# Patient Record
Sex: MALE | Race: WHITE | NOT HISPANIC OR LATINO
[De-identification: names, ages, dates, MRNs, and addresses within clinical notes are randomized per-mention and may not be internally consistent; named-entity substitution may affect disease eponyms.]

---

## 2022-08-09 PROBLEM — Z00.00 ENCOUNTER FOR PREVENTIVE HEALTH EXAMINATION: Status: ACTIVE | Noted: 2022-08-09

## 2022-08-18 ENCOUNTER — APPOINTMENT (OUTPATIENT)
Dept: HEART AND VASCULAR | Facility: CLINIC | Age: 87
End: 2022-08-18

## 2022-08-18 ENCOUNTER — NON-APPOINTMENT (OUTPATIENT)
Age: 87
End: 2022-08-18

## 2022-08-18 VITALS
BODY MASS INDEX: 27.74 KG/M2 | RESPIRATION RATE: 12 BRPM | HEART RATE: 66 BPM | SYSTOLIC BLOOD PRESSURE: 150 MMHG | DIASTOLIC BLOOD PRESSURE: 80 MMHG | HEIGHT: 68 IN | WEIGHT: 183 LBS

## 2022-08-18 DIAGNOSIS — Z78.9 OTHER SPECIFIED HEALTH STATUS: ICD-10-CM

## 2022-08-18 DIAGNOSIS — I73.9 PERIPHERAL VASCULAR DISEASE, UNSPECIFIED: ICD-10-CM

## 2022-08-18 PROCEDURE — 99204 OFFICE O/P NEW MOD 45 MIN: CPT

## 2022-08-18 PROCEDURE — 93000 ELECTROCARDIOGRAM COMPLETE: CPT

## 2022-08-18 RX ORDER — ASPIRIN ENTERIC COATED TABLETS 81 MG 81 MG/1
81 TABLET, DELAYED RELEASE ORAL
Refills: 0 | Status: ACTIVE | COMMUNITY

## 2022-08-18 RX ORDER — ROSUVASTATIN CALCIUM 10 MG/1
10 TABLET, FILM COATED ORAL
Refills: 0 | Status: ACTIVE | COMMUNITY

## 2022-08-18 NOTE — PHYSICAL EXAM
[Well Developed] : well developed [Well Nourished] : well nourished [No Acute Distress] : no acute distress [Normal Conjunctiva] : normal conjunctiva [Normal Venous Pressure] : normal venous pressure [Carotid Bruit] : carotid bruit [Normal S1, S2] : normal S1, S2 [No Rub] : no rub [No Gallop] : no gallop [Clear Lung Fields] : clear lung fields [Good Air Entry] : good air entry [No Respiratory Distress] : no respiratory distress  [Soft] : abdomen soft [Non Tender] : non-tender [No Masses/organomegaly] : no masses/organomegaly [Normal Bowel Sounds] : normal bowel sounds [Normal Gait] : normal gait [No Edema] : no edema [No Cyanosis] : no cyanosis [No Clubbing] : no clubbing [No Varicosities] : no varicosities [No Rash] : no rash [No Skin Lesions] : no skin lesions [Moves all extremities] : moves all extremities [No Focal Deficits] : no focal deficits [Normal Speech] : normal speech [Alert and Oriented] : alert and oriented [Normal memory] : normal memory [de-identified] : lito

## 2022-08-18 NOTE — HISTORY OF PRESENT ILLNESS
[FreeTextEntry1] : 89-year-old male with a past medical history of CAD and peripheral vascular disease comes in for establishment of care. Overall feels well however does have nearly daily lightheadedness. Denies syncope or falling routinely but does have daily lightheadedness. Denies chest pain shortness of breath PND orthopnea.

## 2022-08-18 NOTE — DISCUSSION/SUMMARY
[FreeTextEntry1] : 1. CAD: Continue aspirin and nisoldipine. EKG revealed normal sinus rhythm mobitz 1 AV block. Will consider changing medication for blood pressure once results of an event monitor as known.\par 2. Lightheadedness: Will place a 3 day Holter monitor to assess for bradycardia arrhythmias.\par 2. Peripheral vascular disease: Continue aspirin Crestor. Followup blood work.\par \par Reviewed records from previous cardiologist patient underwent recent echocardiogram which revealed normal LV function and mild aortic stenosis.

## 2022-08-19 LAB
25(OH)D3 SERPL-MCNC: 35.5 NG/ML
ALBUMIN SERPL ELPH-MCNC: 4.8 G/DL
ALP BLD-CCNC: 79 U/L
ALT SERPL-CCNC: 31 U/L
ANION GAP SERPL CALC-SCNC: 13 MMOL/L
AST SERPL-CCNC: 23 U/L
BASOPHILS # BLD AUTO: 0.04 K/UL
BASOPHILS NFR BLD AUTO: 0.4 %
BILIRUB SERPL-MCNC: 0.9 MG/DL
BUN SERPL-MCNC: 21 MG/DL
CALCIUM SERPL-MCNC: 10.4 MG/DL
CHLORIDE SERPL-SCNC: 105 MMOL/L
CHOLEST SERPL-MCNC: 173 MG/DL
CO2 SERPL-SCNC: 28 MMOL/L
CREAT SERPL-MCNC: 1 MG/DL
EGFR: 71 ML/MIN/1.73M2
EOSINOPHIL # BLD AUTO: 0.05 K/UL
EOSINOPHIL NFR BLD AUTO: 0.5 %
ESTIMATED AVERAGE GLUCOSE: 126 MG/DL
FOLATE SERPL-MCNC: >20 NG/ML
GLUCOSE SERPL-MCNC: 92 MG/DL
HBA1C MFR BLD HPLC: 6 %
HCT VFR BLD CALC: 50.8 %
HDLC SERPL-MCNC: 68 MG/DL
HGB BLD-MCNC: 16 G/DL
IMM GRANULOCYTES NFR BLD AUTO: 0.5 %
LDLC SERPL CALC-MCNC: 70 MG/DL
LYMPHOCYTES # BLD AUTO: 2.36 K/UL
LYMPHOCYTES NFR BLD AUTO: 21.9 %
MAGNESIUM SERPL-MCNC: 2.5 MG/DL
MAN DIFF?: NORMAL
MCHC RBC-ENTMCNC: 30.9 PG
MCHC RBC-ENTMCNC: 31.5 GM/DL
MCV RBC AUTO: 98.1 FL
MONOCYTES # BLD AUTO: 0.81 K/UL
MONOCYTES NFR BLD AUTO: 7.5 %
NEUTROPHILS # BLD AUTO: 7.49 K/UL
NEUTROPHILS NFR BLD AUTO: 69.2 %
NONHDLC SERPL-MCNC: 105 MG/DL
PHOSPHATE SERPL-MCNC: 3.6 MG/DL
PLATELET # BLD AUTO: 297 K/UL
POTASSIUM SERPL-SCNC: 5.4 MMOL/L
PROT SERPL-MCNC: 7.2 G/DL
PSA SERPL-MCNC: <0.01 NG/ML
RBC # BLD: 5.18 M/UL
RBC # FLD: 13.6 %
SODIUM SERPL-SCNC: 146 MMOL/L
T3FREE SERPL-MCNC: 2.6 PG/ML
T4 FREE SERPL-MCNC: 1 NG/DL
T4 SERPL-MCNC: 6.3 UG/DL
TRIGL SERPL-MCNC: 175 MG/DL
TSH SERPL-ACNC: 2.37 UIU/ML
VIT B12 SERPL-MCNC: 402 PG/ML
WBC # FLD AUTO: 10.8 K/UL

## 2022-08-22 ENCOUNTER — APPOINTMENT (OUTPATIENT)
Dept: HEART AND VASCULAR | Facility: CLINIC | Age: 87
End: 2022-08-22

## 2022-08-22 VITALS — HEIGHT: 68 IN | WEIGHT: 187 LBS | BODY MASS INDEX: 28.34 KG/M2

## 2022-09-01 ENCOUNTER — NON-APPOINTMENT (OUTPATIENT)
Age: 87
End: 2022-09-01

## 2022-09-01 ENCOUNTER — APPOINTMENT (OUTPATIENT)
Dept: HEART AND VASCULAR | Facility: CLINIC | Age: 87
End: 2022-09-01

## 2022-09-01 VITALS
DIASTOLIC BLOOD PRESSURE: 81 MMHG | TEMPERATURE: 96.2 F | HEART RATE: 72 BPM | WEIGHT: 184 LBS | BODY MASS INDEX: 30.66 KG/M2 | OXYGEN SATURATION: 100 % | HEIGHT: 65 IN | SYSTOLIC BLOOD PRESSURE: 141 MMHG

## 2022-09-01 PROCEDURE — 93000 ELECTROCARDIOGRAM COMPLETE: CPT

## 2022-09-01 PROCEDURE — 99204 OFFICE O/P NEW MOD 45 MIN: CPT

## 2022-09-01 RX ORDER — RIVAROXABAN 15 MG/1
15 TABLET, FILM COATED ORAL
Refills: 0 | Status: DISCONTINUED | COMMUNITY
End: 2022-09-01

## 2022-09-06 NOTE — DISCUSSION/SUMMARY
[EKG obtained to assist in diagnosis and management of assessed problem(s)] : EKG obtained to assist in diagnosis and management of assessed problem(s) [FreeTextEntry1] : 89 year old male with HTN, HLD, PAD s/p carotid stent in 2022 and CAD s/p stents 10 years ago and symptomatic bradycardia, who presents for initial evaluation.  He is active with episodes of dizziness that could like vertigo but others that are clearly lightheadedness with recent near syncope.  We discussed the indications for a pacemaker and this will keep him safe from the slow heart rhythms seen on his monitor but if he does in fact have vertigo it will not help with those symptoms.  Given his near syncope and event monitor I have recommended a dual chamber pacemaker which he is amenable to.   We discussed the procedure in detail including risks, benefits, and alternatives.  We discussed procedural risks--including but not limited to bleeding/pocket hematoma, phlebitis/thrombophlebitis, lead dislodgment, PPM and/or lead infection, device malfunction, implantation site discomfort, pneumothorax, hemothorax, pericardial bleed/tamponade, anaphylaxis, air embolism, infection, skin erosion, lead fracture, lead dislodgement, pectoral muscle stimulation, intercostal or diaphragmatic pacing, vascular thrombosis, endocarditis or need for emergent surgery..\par \par After consideration of this information, the decision was made to proceed with device implantation. Mr. Sawyer appeared to understand the whole discussion and verbalized that all his questions were answered to his satisfaction.  He was given pre procedure instructions and knows to call with any questions or concerns.  \par \par

## 2022-09-06 NOTE — CARDIOLOGY SUMMARY
[de-identified] : 9/1/2022 sinus rhythm with a prolonged MI and APC [de-identified] : 5/2022 EF 65% mild RA dilation.  mild-mod TR right normal/left normal

## 2022-09-06 NOTE — REVIEW OF SYSTEMS
[Dizziness] : dizziness [Negative] : Integumentary [Fever] : no fever [Chills] : no chills [Feeling Fatigued] : not feeling fatigued [SOB] : no shortness of breath [Dyspnea on exertion] : not dyspnea during exertion [Palpitations] : no palpitations [Orthopnea] : no orthopnea [Easy Bleeding] : no tendency for easy bleeding

## 2022-09-06 NOTE — HISTORY OF PRESENT ILLNESS
[FreeTextEntry1] : 89 year old male with HTN, HLD, PAD s/p carotid stent in 2022 and CAD s/p stents 10 years ago and symptomatic bradycardia, who presents for initial evaluation.\par \par He states he gets lightheaded "every day" that also sometimes feels like the room is spinning.  He also regularly feels imbalanced.  This has been going on for at least 5 years.  This past weekend for the first time he thought he was almost going to pass out.  He states he was in his usual state of health and got off the bus and then "everything almost blacked out".  He also feels a chest sensation that is not exertional and states he had a cardiac workup and was told his "stents look good".  No SOB, orthopnea, PND or palpations. \par \par He wore an event monitor with heart rates 39=.  One short burst of an SVT.  Episodes of Wenckebach and some high degree AV block.  Most episodes while sleeping.

## 2022-09-06 NOTE — PHYSICAL EXAM
[Well Developed] : well developed [Well Nourished] : well nourished [No Acute Distress] : no acute distress [5th Left ICS - MCL] : palpated at the 5th LICS in the midclavicular line [Normal Rate] : normal [Rhythm Regular] : regular [Normal S1] : normal S1 [Normal S2] : normal S2 [Normal Gait] : normal gait [No Edema] : no edema [No Rash] : no rash [Moves all extremities] : moves all extremities [Alert and Oriented] : alert and oriented

## 2022-09-06 NOTE — ADDENDUM
[FreeTextEntry1] : I, Ac Wisdom, hereby attest that the medical record entry for this patient accurately reflects signatures/notations that I made on the Date of Service in my capacity as an Attending Physician when I treated/diagnosed the above patient. I do hereby attest that this information is true, accurate and complete to the best of my knowledge and I understand that any falsification, omission, or concealment of material fact may subject me to administrative, civil, or, criminal liability. I agree with the note as written by my PA in its entirety.\par I was present for the entire visit and supervised the entire visit and agree with the plan as outlined.\par \par

## 2022-09-07 LAB — SARS-COV-2 N GENE NPH QL NAA+PROBE: NOT DETECTED

## 2022-09-09 ENCOUNTER — INPATIENT (INPATIENT)
Facility: HOSPITAL | Age: 87
LOS: 0 days | Discharge: HOME | DRG: 244 | End: 2022-09-10
Attending: INTERNAL MEDICINE | Admitting: INTERNAL MEDICINE
Payer: COMMERCIAL

## 2022-09-09 VITALS — HEIGHT: 68 IN | WEIGHT: 182.1 LBS

## 2022-09-09 DIAGNOSIS — Z90.49 ACQUIRED ABSENCE OF OTHER SPECIFIED PARTS OF DIGESTIVE TRACT: Chronic | ICD-10-CM

## 2022-09-09 DIAGNOSIS — Z98.890 OTHER SPECIFIED POSTPROCEDURAL STATES: Chronic | ICD-10-CM

## 2022-09-09 DIAGNOSIS — I49.5 SICK SINUS SYNDROME: ICD-10-CM

## 2022-09-09 DIAGNOSIS — Z98.62 PERIPHERAL VASCULAR ANGIOPLASTY STATUS: Chronic | ICD-10-CM

## 2022-09-09 LAB
ISTAT INR: 1.1 — SIGNIFICANT CHANGE UP (ref 0.88–1.16)
ISTAT PT: 13.2 SEC — HIGH (ref 10–12.9)
ISTAT VENOUS BE: 4 MMOL/L — HIGH (ref -2–3)
ISTAT VENOUS GLUCOSE: 101 MG/DL — HIGH (ref 70–99)
ISTAT VENOUS HCO3: 30 MMOL/L — HIGH (ref 23–28)
ISTAT VENOUS HEMATOCRIT: 50 % — SIGNIFICANT CHANGE UP (ref 39–50)
ISTAT VENOUS HEMOGLOBIN: 17 GM/DL — SIGNIFICANT CHANGE UP (ref 13–17)
ISTAT VENOUS IONIZED CALCIUM: 1.24 MMOL/L — SIGNIFICANT CHANGE UP (ref 1.12–1.3)
ISTAT VENOUS PCO2: 51 MMHG — SIGNIFICANT CHANGE UP (ref 41–51)
ISTAT VENOUS PH: 7.38 — SIGNIFICANT CHANGE UP (ref 7.31–7.41)
ISTAT VENOUS PO2: <66 MMHG — LOW (ref 35–40)
ISTAT VENOUS POTASSIUM: 3.7 MMOL/L — SIGNIFICANT CHANGE UP (ref 3.5–5.3)
ISTAT VENOUS SO2: 64 % — SIGNIFICANT CHANGE UP
ISTAT VENOUS SODIUM: 143 MMOL/L — SIGNIFICANT CHANGE UP (ref 135–145)
ISTAT VENOUS TCO2: 32 MMOL/L — HIGH (ref 22–31)
POCT ISTAT CREATININE: 1 MG/DL — SIGNIFICANT CHANGE UP (ref 0.5–1.3)

## 2022-09-09 PROCEDURE — 33208 INSRT HEART PM ATRIAL & VENT: CPT | Mod: KX

## 2022-09-09 PROCEDURE — ZZZZZ: CPT

## 2022-09-09 RX ORDER — ASPIRIN/CALCIUM CARB/MAGNESIUM 324 MG
81 TABLET ORAL DAILY
Refills: 0 | Status: DISCONTINUED | OUTPATIENT
Start: 2022-09-09 | End: 2022-09-10

## 2022-09-09 RX ORDER — ASPIRIN/CALCIUM CARB/MAGNESIUM 324 MG
1 TABLET ORAL
Qty: 0 | Refills: 0 | DISCHARGE

## 2022-09-09 RX ORDER — CEFAZOLIN SODIUM 1 G
1000 VIAL (EA) INJECTION EVERY 8 HOURS
Refills: 0 | Status: COMPLETED | OUTPATIENT
Start: 2022-09-09 | End: 2022-09-10

## 2022-09-09 RX ORDER — ROSUVASTATIN CALCIUM 5 MG/1
1 TABLET ORAL
Qty: 0 | Refills: 0 | DISCHARGE

## 2022-09-09 RX ORDER — AMLODIPINE BESYLATE 2.5 MG/1
5 TABLET ORAL DAILY
Refills: 0 | Status: DISCONTINUED | OUTPATIENT
Start: 2022-09-09 | End: 2022-09-10

## 2022-09-09 RX ORDER — INFLUENZA VIRUS VACCINE 15; 15; 15; 15 UG/.5ML; UG/.5ML; UG/.5ML; UG/.5ML
0.7 SUSPENSION INTRAMUSCULAR ONCE
Refills: 0 | Status: COMPLETED | OUTPATIENT
Start: 2022-09-09 | End: 2022-09-09

## 2022-09-09 RX ORDER — NISOLDIPINE 25.5 MG/1
1 TABLET, FILM COATED, EXTENDED RELEASE ORAL
Qty: 0 | Refills: 0 | DISCHARGE

## 2022-09-09 RX ORDER — ATORVASTATIN CALCIUM 80 MG/1
40 TABLET, FILM COATED ORAL DAILY
Refills: 0 | Status: DISCONTINUED | OUTPATIENT
Start: 2022-09-09 | End: 2022-09-10

## 2022-09-09 RX ORDER — ACETAMINOPHEN 500 MG
650 TABLET ORAL EVERY 6 HOURS
Refills: 0 | Status: DISCONTINUED | OUTPATIENT
Start: 2022-09-09 | End: 2022-09-10

## 2022-09-09 RX ADMIN — Medication 100 MILLIGRAM(S): at 21:47

## 2022-09-09 RX ADMIN — AMLODIPINE BESYLATE 5 MILLIGRAM(S): 2.5 TABLET ORAL at 12:16

## 2022-09-09 RX ADMIN — ATORVASTATIN CALCIUM 40 MILLIGRAM(S): 80 TABLET, FILM COATED ORAL at 21:47

## 2022-09-09 RX ADMIN — Medication 650 MILLIGRAM(S): at 23:13

## 2022-09-09 RX ADMIN — Medication 650 MILLIGRAM(S): at 13:22

## 2022-09-09 RX ADMIN — Medication 650 MILLIGRAM(S): at 12:59

## 2022-09-09 RX ADMIN — Medication 81 MILLIGRAM(S): at 12:15

## 2022-09-09 NOTE — H&P ADULT - NSICDXPASTMEDICALHX_GEN_ALL_CORE_FT
PAST MEDICAL HISTORY:  CAD (coronary artery disease)     HLD (hyperlipidemia)     HTN (hypertension)     PAD (peripheral artery disease)

## 2022-09-09 NOTE — PATIENT PROFILE ADULT - 
ADDITIONAL INFORMATION
Unable to remember but only recalls getting vaccine at the hospital when he was admitted a few years ago. Most likely [place]

## 2022-09-09 NOTE — H&P ADULT - HISTORY OF PRESENT ILLNESS
89 year old male with HTN, HLD, PAD s/p carotid stent in 2022 and CAD s/p stents 10 years ago and symptomatic bradycardia, SSS,  who presents for pacemaker implant ,   He states he gets lightheaded "every day" that also sometimes feels like the room is spinning.  He also regularly feels imbalanced.  This has been going on for at least 5 years.  This past weekend for the first time he thought he was almost going to pass out.  He states he was in his usual state of health and got off the bus and then "everything almost blacked out".  He also feels a chest sensation that is not exertional and states he had a cardiac workup and was told his "stents look good".  No SOB, orthopnea, PND or palpations.   He wore an event monitor with heart rates 39=.  One short burst of an SVT.  Episodes of Wenckebach and some high degree AV block.  Most episodes while sleeping.

## 2022-09-09 NOTE — H&P ADULT - NSICDXPASTSURGICALHX_GEN_ALL_CORE_FT
PAST SURGICAL HISTORY:  H/O hernia repair     History of appendectomy     History of carotid angioplasty

## 2022-09-09 NOTE — PATIENT PROFILE ADULT - FALL HARM RISK - HARM RISK INTERVENTIONS

## 2022-09-09 NOTE — H&P ADULT - ASSESSMENT
89 year old male with HTN, HLD, PAD s/p carotid stent in 2022 and CAD s/p stents 10 years ago and symptomatic bradycardia, SSS,  who presents for pacemaker implant ,

## 2022-09-09 NOTE — PROGRESS NOTE ADULT - SUBJECTIVE AND OBJECTIVE BOX
Brief note, full note in results section of [facility].  Access via two left axillary punctures.  [employer] DDD (MRI compatible) pacemaker implanted.  No complications.  Chest x-ray and ECG.  Expect discharge home in am.

## 2022-09-10 ENCOUNTER — TRANSCRIPTION ENCOUNTER (OUTPATIENT)
Age: 87
End: 2022-09-10

## 2022-09-10 VITALS
RESPIRATION RATE: 17 BRPM | OXYGEN SATURATION: 99 % | HEART RATE: 71 BPM | SYSTOLIC BLOOD PRESSURE: 124 MMHG | DIASTOLIC BLOOD PRESSURE: 68 MMHG

## 2022-09-10 PROCEDURE — 71046 X-RAY EXAM CHEST 2 VIEWS: CPT | Mod: 26

## 2022-09-10 RX ADMIN — Medication 650 MILLIGRAM(S): at 00:00

## 2022-09-10 RX ADMIN — Medication 100 MILLIGRAM(S): at 05:45

## 2022-09-10 RX ADMIN — AMLODIPINE BESYLATE 5 MILLIGRAM(S): 2.5 TABLET ORAL at 05:45

## 2022-09-10 RX ADMIN — Medication 81 MILLIGRAM(S): at 11:40

## 2022-09-10 NOTE — DISCHARGE NOTE PROVIDER - NSDCFUSCHEDAPPT_GEN_ALL_CORE_FT
Clint Merritt  [facility]  [facility] [address]  Scheduled Appointment: 11/15/2022

## 2022-09-10 NOTE — DISCHARGE NOTE NURSING/CASE MANAGEMENT/SOCIAL WORK - NSDCPEFALRISK_GEN_ALL_CORE
For information on Fall & Injury Prevention, visit: [email or web address] OR  [email or web address] OR  https://www.cdc.gov/steadi/patient.html

## 2022-09-10 NOTE — DISCHARGE NOTE PROVIDER - HOSPITAL COURSE
89 year old male with HTN, HLD, PAD s/p carotid stent in 2022 and CAD s/p stents 10 years ago and symptomatic bradycardia, SSS,  who presents for pacemaker implant.    Problem List/Main Diagnoses :  # Sick sinus syndrome. Patient had [employer] DDD (MRI compatible) pacemaker implanted. No complications.    New medications: None

## 2022-09-10 NOTE — DISCHARGE NOTE PROVIDER - NSDCMRMEDTOKEN_GEN_ALL_CORE_FT
aspirin 81 mg oral delayed release tablet: 1 tab(s) orally once a day  nisoldipine 17 mg oral tablet, extended release: 1 tab(s) orally once a day  rosuvastatin 10 mg oral tablet: 1 tab(s) orally once a day

## 2022-09-10 NOTE — DISCHARGE NOTE PROVIDER - NSDCACTIVITY_GEN_ALL_CORE
No restrictions No restrictions/Showering allowed/Walking - Indoors allowed/No heavy lifting/straining/Walking - Outdoors allowed

## 2022-09-10 NOTE — DISCHARGE NOTE PROVIDER - NSDCCPCAREPLAN_GEN_ALL_CORE_FT
PRINCIPAL DISCHARGE DIAGNOSIS  Diagnosis: Sick sinus syndrome  Assessment and Plan of Treatment: You have sick sinus syndrome. Sick sinus syndrome is a heart rhythm disorder. Your heart may beat too slowly or too quickly. It may go back and forth from too slow to too fast. You may have pauses in between beats.   You had a [employer] DDD (MRI compatible) pacemaker implanted. You had no complications. Please follow up with Dr. Wisdom next week in clinic.

## 2022-09-10 NOTE — DISCHARGE NOTE PROVIDER - CARE PROVIDER_API CALL
Ac Wisdom (MD)  Cardiac Electrophysiology; Cardiovascular Disease; Internal Medicine  [address] [place] [address]  Phone: [phone]  Fax: [phone]  Follow Up Time:

## 2022-09-10 NOTE — DISCHARGE NOTE NURSING/CASE MANAGEMENT/SOCIAL WORK - PATIENT PORTAL LINK FT
You can access the FollowMyHealth Patient Portal offered by [facility] by registering at the following website: [email or web address]. By joining [facility] [employer]’s FollowMyHealth portal, you will also be able to view your health information using other applications (apps) compatible with our system.

## 2022-09-12 PROBLEM — I25.10 ATHEROSCLEROTIC HEART DISEASE OF NATIVE CORONARY ARTERY WITHOUT ANGINA PECTORIS: Chronic | Status: ACTIVE | Noted: 2022-09-09

## 2022-09-12 PROBLEM — I73.9 PERIPHERAL VASCULAR DISEASE, UNSPECIFIED: Chronic | Status: ACTIVE | Noted: 2022-09-09

## 2022-09-12 PROBLEM — E78.5 HYPERLIPIDEMIA, UNSPECIFIED: Chronic | Status: ACTIVE | Noted: 2022-09-09

## 2022-09-12 PROBLEM — I10 ESSENTIAL (PRIMARY) HYPERTENSION: Chronic | Status: ACTIVE | Noted: 2022-09-09

## 2022-09-14 DIAGNOSIS — I73.9 PERIPHERAL VASCULAR DISEASE, UNSPECIFIED: ICD-10-CM

## 2022-09-14 DIAGNOSIS — I49.5 SICK SINUS SYNDROME: ICD-10-CM

## 2022-09-14 DIAGNOSIS — I49.1 ATRIAL PREMATURE DEPOLARIZATION: ICD-10-CM

## 2022-09-14 DIAGNOSIS — I51.9 HEART DISEASE, UNSPECIFIED: ICD-10-CM

## 2022-09-14 DIAGNOSIS — I25.10 ATHEROSCLEROTIC HEART DISEASE OF NATIVE CORONARY ARTERY WITHOUT ANGINA PECTORIS: ICD-10-CM

## 2022-09-14 DIAGNOSIS — I10 ESSENTIAL (PRIMARY) HYPERTENSION: ICD-10-CM

## 2022-09-14 DIAGNOSIS — E78.5 HYPERLIPIDEMIA, UNSPECIFIED: ICD-10-CM

## 2022-09-14 DIAGNOSIS — Z95.5 PRESENCE OF CORONARY ANGIOPLASTY IMPLANT AND GRAFT: ICD-10-CM

## 2022-09-14 DIAGNOSIS — Z79.82 LONG TERM (CURRENT) USE OF ASPIRIN: ICD-10-CM

## 2022-09-15 PROCEDURE — C1785: CPT

## 2022-09-15 PROCEDURE — 85014 HEMATOCRIT: CPT

## 2022-09-15 PROCEDURE — 82803 BLOOD GASES ANY COMBINATION: CPT

## 2022-09-15 PROCEDURE — 82947 ASSAY GLUCOSE BLOOD QUANT: CPT

## 2022-09-15 PROCEDURE — C1898: CPT

## 2022-09-15 PROCEDURE — 82565 ASSAY OF CREATININE: CPT

## 2022-09-15 PROCEDURE — 84132 ASSAY OF SERUM POTASSIUM: CPT

## 2022-09-15 PROCEDURE — 33208 INSRT HEART PM ATRIAL & VENT: CPT

## 2022-09-15 PROCEDURE — 84295 ASSAY OF SERUM SODIUM: CPT

## 2022-09-15 PROCEDURE — 71046 X-RAY EXAM CHEST 2 VIEWS: CPT

## 2022-09-15 PROCEDURE — 82330 ASSAY OF CALCIUM: CPT

## 2022-09-15 PROCEDURE — 85610 PROTHROMBIN TIME: CPT

## 2022-11-10 ENCOUNTER — APPOINTMENT (OUTPATIENT)
Dept: HEART AND VASCULAR | Facility: CLINIC | Age: 87
End: 2022-11-10

## 2022-11-10 VITALS
TEMPERATURE: 96.2 F | HEIGHT: 65 IN | HEART RATE: 71 BPM | WEIGHT: 184 LBS | BODY MASS INDEX: 30.66 KG/M2 | SYSTOLIC BLOOD PRESSURE: 136 MMHG | DIASTOLIC BLOOD PRESSURE: 60 MMHG

## 2022-11-10 PROCEDURE — 93280 PM DEVICE PROGR EVAL DUAL: CPT

## 2022-11-15 ENCOUNTER — RX RENEWAL (OUTPATIENT)
Age: 87
End: 2022-11-15

## 2022-11-15 ENCOUNTER — APPOINTMENT (OUTPATIENT)
Dept: HEART AND VASCULAR | Facility: CLINIC | Age: 87
End: 2022-11-15

## 2022-11-15 ENCOUNTER — NON-APPOINTMENT (OUTPATIENT)
Age: 87
End: 2022-11-15

## 2022-11-15 VITALS
HEIGHT: 65 IN | HEART RATE: 80 BPM | RESPIRATION RATE: 12 BRPM | DIASTOLIC BLOOD PRESSURE: 90 MMHG | WEIGHT: 188 LBS | BODY MASS INDEX: 31.32 KG/M2 | SYSTOLIC BLOOD PRESSURE: 150 MMHG

## 2022-11-15 PROCEDURE — 93000 ELECTROCARDIOGRAM COMPLETE: CPT

## 2022-11-15 PROCEDURE — 99214 OFFICE O/P EST MOD 30 MIN: CPT

## 2022-11-15 RX ORDER — OXYCODONE HYDROCHLORIDE 15 MG/1
15 TABLET ORAL
Qty: 90 | Refills: 0 | Status: DISCONTINUED | COMMUNITY
Start: 2022-10-27

## 2022-11-15 RX ORDER — NISOLDIPINE 17 MG/1
17 TABLET, FILM COATED, EXTENDED RELEASE ORAL
Refills: 0 | Status: DISCONTINUED | COMMUNITY
End: 2022-11-15

## 2022-11-15 NOTE — DISCUSSION/SUMMARY
[FreeTextEntry1] : 1.  Lightheadedness: Looked up nisoldipine, and 3 to 10% of patients can have lightheadedness and dizziness.  Advised to discontinue.\par 2.  Hypertension: Will start Toprol 50 mg daily, advised to continue to monitor blood pressure and heart rate. [EKG obtained to assist in diagnosis and management of assessed problem(s)] : EKG obtained to assist in diagnosis and management of assessed problem(s)

## 2022-11-15 NOTE — HISTORY OF PRESENT ILLNESS
[FreeTextEntry1] : 89-year-old male with a past medical history of hypertension status post permanent pacemaker implant.  Patient was complaining of lightheadedness and near syncope.  Was seen by EP recommended to have a permanent pacemaker implant.  Patient is now status post pacemaker implant however still feels lightheaded.  Has never syncopized.

## 2022-11-20 NOTE — DISCUSSION/SUMMARY
[FreeTextEntry1] : 89 year old male with HTN, HLD, PAD s/p carotid stent in 2022 and CAD s/p stents 10 years ago and symptomatic bradycardia, with complaints of dizziness and near syncope. He underwent a  dual chamber pacemaker implant in September and presents today for a follow-up. He still complains of dizziness, and is yet to see an ENT specialist. He does have a high apc burden on his monitor, i recommend a trial of beta blocker therapy, to suprress james atrial ectopy. All of his questions were answered and he  knows to contact me should any further queries arise.

## 2022-11-20 NOTE — CARDIOLOGY SUMMARY
[de-identified] : 11/10/2022- sinus , ventricular paced rhythm at 76 beats per min\par 9/1/2022 sinus rhythm with a prolonged TX and APC [de-identified] : 5/2022 EF 65% mild RA dilation.  mild-mod TR

## 2022-11-20 NOTE — PROCEDURE
[No] : not [NSR] : normal sinus rhythm [Pacemaker] : pacemaker [DDD] : DDD [Lead Imp:  ___ohms] : lead impedance was [unfilled] ohms [Sensing Amplitude ___mv] : sensing amplitude was [unfilled] mv [___V @] : [unfilled] V [___ ms] : [unfilled] ms [Threshold Testing Performed] : Threshold testing was not performed [de-identified] : [facility] [de-identified] : [employer] mri el [de-identified] : [id number] [de-identified] : 09/09/22 [de-identified] : Naz SHAHI with vvi back up [de-identified] : 9.5  [de-identified] : A paced 57%, \par V Paced 74%

## 2022-11-20 NOTE — ADDENDUM
[FreeTextEntry1] : I, Ac Wisdom, hereby attest that the medical record entry for this patient accurately reflects signatures/notations that I made on the Date of Service in my capacity as an Attending Physician when I treated/diagnosed the above patient. I do hereby attest that this information is true, accurate and complete to the best of my knowledge and I understand that any falsification, omission, or concealment of material fact may subject me to administrative, civil, or, criminal liability. I agree with the note as written by my Fellow in its entirety.\par I was present for the entire visit and supervised the entire visit and agree with the plan as outlined.\par \par

## 2022-11-20 NOTE — HISTORY OF PRESENT ILLNESS
[Dizziness] : dizziness [FreeTextEntry1] : 89 year old male with HTN, HLD, PAD s/p carotid stent in 2022 and CAD s/p stents 10 years ago and symptomatic bradycardia, who presents for initial evaluation.\par \par He states he gets lightheaded "every day" that also sometimes feels like the room is spinning.  He also regularly feels imbalanced.  This has been going on for at least 5 years.  This past weekend for the first time he thought he was almost going to pass out.  He states he was in his usual state of health and got off the bus and then "everything almost blacked out".  He also feels a chest sensation that is not exertional and states he had a cardiac workup and was told his "stents look good".  No SOB, orthopnea, PND or palpations. \par \par He wore an event monitor with heart rates 39=.  One short burst of an SVT.  Episodes of Wenckebach and some high degree AV block.  Most episodes while sleeping.

## 2022-11-29 ENCOUNTER — APPOINTMENT (OUTPATIENT)
Dept: HEART AND VASCULAR | Facility: CLINIC | Age: 87
End: 2022-11-29

## 2022-11-29 VITALS
HEART RATE: 66 BPM | WEIGHT: 188 LBS | BODY MASS INDEX: 31.32 KG/M2 | DIASTOLIC BLOOD PRESSURE: 88 MMHG | SYSTOLIC BLOOD PRESSURE: 144 MMHG | RESPIRATION RATE: 12 BRPM | HEIGHT: 65 IN

## 2022-11-29 PROCEDURE — 99214 OFFICE O/P EST MOD 30 MIN: CPT

## 2022-11-29 NOTE — DISCUSSION/SUMMARY
[FreeTextEntry1] : 1.  Hypertension: Blood pressure adequately controlled on current medication advised to continue and to remain off nisoldipine.  Maintain a low-salt diet.\par 2.  Lightheadedness: Suggest neurology evaluation if symptoms persist or worsen.

## 2022-11-29 NOTE — HISTORY OF PRESENT ILLNESS
[FreeTextEntry1] : 89-year-old male with a past medical history of hypertension status post permanent pacemaker.  Since changing antihypertensive medication patient does report continued lightheadedness, does not see marked improvement in lightheadedness however denies any syncope.

## 2022-12-12 ENCOUNTER — APPOINTMENT (OUTPATIENT)
Dept: HEART AND VASCULAR | Facility: CLINIC | Age: 87
End: 2022-12-12

## 2022-12-12 ENCOUNTER — NON-APPOINTMENT (OUTPATIENT)
Age: 87
End: 2022-12-12

## 2022-12-12 PROCEDURE — 93296 REM INTERROG EVL PM/IDS: CPT

## 2022-12-12 PROCEDURE — 93294 REM INTERROG EVL PM/LDLS PM: CPT

## 2022-12-13 RX ORDER — PHENOBARBITAL 15 MG/1
15 TABLET ORAL DAILY
Qty: 30 | Refills: 0 | Status: ACTIVE | OUTPATIENT
Start: 2022-12-13

## 2023-03-13 ENCOUNTER — NON-APPOINTMENT (OUTPATIENT)
Age: 88
End: 2023-03-13

## 2023-03-13 ENCOUNTER — APPOINTMENT (OUTPATIENT)
Dept: HEART AND VASCULAR | Facility: CLINIC | Age: 88
End: 2023-03-13
Payer: MEDICARE

## 2023-03-13 PROCEDURE — 93294 REM INTERROG EVL PM/LDLS PM: CPT

## 2023-03-13 PROCEDURE — 93296 REM INTERROG EVL PM/IDS: CPT

## 2023-03-28 ENCOUNTER — NON-APPOINTMENT (OUTPATIENT)
Age: 88
End: 2023-03-28

## 2023-03-28 ENCOUNTER — APPOINTMENT (OUTPATIENT)
Dept: HEART AND VASCULAR | Facility: CLINIC | Age: 88
End: 2023-03-28
Payer: MEDICARE

## 2023-03-28 VITALS
HEART RATE: 70 BPM | WEIGHT: 190 LBS | BODY MASS INDEX: 31.62 KG/M2 | RESPIRATION RATE: 12 BRPM | DIASTOLIC BLOOD PRESSURE: 80 MMHG | SYSTOLIC BLOOD PRESSURE: 130 MMHG

## 2023-03-28 DIAGNOSIS — R42 DIZZINESS AND GIDDINESS: ICD-10-CM

## 2023-03-28 PROCEDURE — 93000 ELECTROCARDIOGRAM COMPLETE: CPT

## 2023-03-28 PROCEDURE — 99214 OFFICE O/P EST MOD 30 MIN: CPT

## 2023-03-28 NOTE — DISCUSSION/SUMMARY
[FreeTextEntry1] : 1.  CAD: Continue aspirin and metoprolol\par 2.  Hyperlipidemia: Continue Crestor\par 3.  Lightheadedness: Chronic and stable.  Patient status post permanent pacemaker advised to follow-up with EP for interrogation. [EKG obtained to assist in diagnosis and management of assessed problem(s)] : EKG obtained to assist in diagnosis and management of assessed problem(s)

## 2023-03-28 NOTE — HISTORY OF PRESENT ILLNESS
[FreeTextEntry1] : 89-year-old male with past medical history of hyperlipidemia hypertension comes in for follow-up.  Overall, feels at baseline continues to suffer from lightheadedness however no syncope.  Denies chest pain shortness of breath PND orthopnea.

## 2023-03-29 ENCOUNTER — NON-APPOINTMENT (OUTPATIENT)
Age: 88
End: 2023-03-29

## 2023-03-29 LAB
ALBUMIN SERPL ELPH-MCNC: 4.8 G/DL
ALP BLD-CCNC: 93 U/L
ALT SERPL-CCNC: 18 U/L
ANION GAP SERPL CALC-SCNC: 13 MMOL/L
AST SERPL-CCNC: 19 U/L
BASOPHILS # BLD AUTO: 0.03 K/UL
BASOPHILS NFR BLD AUTO: 0.4 %
BILIRUB SERPL-MCNC: 0.8 MG/DL
BUN SERPL-MCNC: 14 MG/DL
CALCIUM SERPL-MCNC: 9.9 MG/DL
CHLORIDE SERPL-SCNC: 105 MMOL/L
CHOLEST SERPL-MCNC: 159 MG/DL
CO2 SERPL-SCNC: 26 MMOL/L
CREAT SERPL-MCNC: 1 MG/DL
EGFR: 70 ML/MIN/1.73M2
EOSINOPHIL # BLD AUTO: 0.1 K/UL
EOSINOPHIL NFR BLD AUTO: 1.4 %
ESTIMATED AVERAGE GLUCOSE: 123 MG/DL
FOLATE SERPL-MCNC: 12.3 NG/ML
GLUCOSE SERPL-MCNC: 98 MG/DL
HBA1C MFR BLD HPLC: 5.9 %
HCT VFR BLD CALC: 49.4 %
HDLC SERPL-MCNC: 50 MG/DL
HGB BLD-MCNC: 15.2 G/DL
IMM GRANULOCYTES NFR BLD AUTO: 0.3 %
LDLC SERPL CALC-MCNC: 67 MG/DL
LYMPHOCYTES # BLD AUTO: 1.99 K/UL
LYMPHOCYTES NFR BLD AUTO: 26.9 %
MAGNESIUM SERPL-MCNC: 2.4 MG/DL
MAN DIFF?: NORMAL
MCHC RBC-ENTMCNC: 29.9 PG
MCHC RBC-ENTMCNC: 30.8 GM/DL
MCV RBC AUTO: 97.2 FL
MONOCYTES # BLD AUTO: 0.71 K/UL
MONOCYTES NFR BLD AUTO: 9.6 %
NEUTROPHILS # BLD AUTO: 4.54 K/UL
NEUTROPHILS NFR BLD AUTO: 61.4 %
NONHDLC SERPL-MCNC: 109 MG/DL
PHOSPHATE SERPL-MCNC: 3.1 MG/DL
PLATELET # BLD AUTO: 251 K/UL
POTASSIUM SERPL-SCNC: 5.3 MMOL/L
PROT SERPL-MCNC: 7.2 G/DL
RBC # BLD: 5.08 M/UL
RBC # FLD: 13.8 %
SODIUM SERPL-SCNC: 144 MMOL/L
T3FREE SERPL-MCNC: 2.8 PG/ML
T4 FREE SERPL-MCNC: 0.8 NG/DL
T4 SERPL-MCNC: 5.5 UG/DL
TRIGL SERPL-MCNC: 212 MG/DL
TSH SERPL-ACNC: 2.12 UIU/ML
VIT B12 SERPL-MCNC: 591 PG/ML
WBC # FLD AUTO: 7.39 K/UL

## 2023-03-30 LAB — PSA SERPL-MCNC: <0.01 NG/ML

## 2023-05-11 ENCOUNTER — APPOINTMENT (OUTPATIENT)
Dept: HEART AND VASCULAR | Facility: CLINIC | Age: 88
End: 2023-05-11

## 2023-05-22 ENCOUNTER — RX RENEWAL (OUTPATIENT)
Age: 88
End: 2023-05-22

## 2023-05-22 RX ORDER — METOPROLOL SUCCINATE 50 MG/1
50 TABLET, EXTENDED RELEASE ORAL
Qty: 30 | Refills: 5 | Status: ACTIVE | COMMUNITY
Start: 2022-11-15 | End: 1900-01-01

## 2023-06-13 ENCOUNTER — APPOINTMENT (OUTPATIENT)
Dept: HEART AND VASCULAR | Facility: CLINIC | Age: 88
End: 2023-06-13
Payer: MEDICARE

## 2023-06-13 ENCOUNTER — NON-APPOINTMENT (OUTPATIENT)
Age: 88
End: 2023-06-13

## 2023-06-13 PROCEDURE — 93294 REM INTERROG EVL PM/LDLS PM: CPT

## 2023-06-13 PROCEDURE — 93296 REM INTERROG EVL PM/IDS: CPT

## 2023-07-25 ENCOUNTER — NON-APPOINTMENT (OUTPATIENT)
Age: 88
End: 2023-07-25

## 2023-07-25 ENCOUNTER — APPOINTMENT (OUTPATIENT)
Dept: HEART AND VASCULAR | Facility: CLINIC | Age: 88
End: 2023-07-25
Payer: MEDICARE

## 2023-07-25 VITALS
RESPIRATION RATE: 12 BRPM | HEART RATE: 68 BPM | SYSTOLIC BLOOD PRESSURE: 130 MMHG | DIASTOLIC BLOOD PRESSURE: 70 MMHG | BODY MASS INDEX: 31.65 KG/M2 | HEIGHT: 65 IN | WEIGHT: 190 LBS

## 2023-07-25 DIAGNOSIS — R01.1 CARDIAC MURMUR, UNSPECIFIED: ICD-10-CM

## 2023-07-25 DIAGNOSIS — R09.89 OTHER SPECIFIED SYMPTOMS AND SIGNS INVOLVING THE CIRCULATORY AND RESPIRATORY SYSTEMS: ICD-10-CM

## 2023-07-25 PROCEDURE — 93288 INTERROG EVL PM/LDLS PM IP: CPT

## 2023-07-25 PROCEDURE — 93880 EXTRACRANIAL BILAT STUDY: CPT

## 2023-07-25 PROCEDURE — 99214 OFFICE O/P EST MOD 30 MIN: CPT

## 2023-07-25 PROCEDURE — 93306 TTE W/DOPPLER COMPLETE: CPT

## 2023-07-25 PROCEDURE — ZZZZZ: CPT

## 2023-07-25 PROCEDURE — 93000 ELECTROCARDIOGRAM COMPLETE: CPT

## 2023-07-25 NOTE — DISCUSSION/SUMMARY
[FreeTextEntry1] : 1.  CAD: Continue aspirin and metoprolol.  EKG.\par 2.  Hypertension: Well-controlled on metoprolol alone advised to continue\par 3.  Hyperlipidemia: Continue Crestor follow-up with PCP for fasting lipid panel\par 4.  Cardiac murmur: Echocardiogram\par 5.  Carotid bruit: Carotid duplex\par 6.  Permanent pacemaker implant: Pacemaker interrogation today. [EKG obtained to assist in diagnosis and management of assessed problem(s)] : EKG obtained to assist in diagnosis and management of assessed problem(s)

## 2023-07-25 NOTE — HISTORY OF PRESENT ILLNESS
[FreeTextEntry1] : 89-year-old male with past medical history of CAD hyperlipidemia hypertension comes in for follow-up.  Overall, feels well denies chest pain shortness of breath PND orthopnea.

## 2023-07-25 NOTE — PROCEDURE
[Pacemaker] : pacemaker [DDD] : DDD [Longevity: ___ months] : The estimated remaining battery life is [unfilled] months [de-identified] : [facility]

## 2023-07-25 NOTE — PHYSICAL EXAM

## 2023-09-12 ENCOUNTER — APPOINTMENT (OUTPATIENT)
Dept: HEART AND VASCULAR | Facility: CLINIC | Age: 88
End: 2023-09-12

## 2023-10-17 ENCOUNTER — APPOINTMENT (OUTPATIENT)
Dept: HEART AND VASCULAR | Facility: CLINIC | Age: 88
End: 2023-10-17

## 2023-11-21 ENCOUNTER — NON-APPOINTMENT (OUTPATIENT)
Age: 88
End: 2023-11-21

## 2023-11-21 ENCOUNTER — APPOINTMENT (OUTPATIENT)
Dept: HEART AND VASCULAR | Facility: CLINIC | Age: 88
End: 2023-11-21
Payer: MEDICARE

## 2023-11-21 PROCEDURE — 93294 REM INTERROG EVL PM/LDLS PM: CPT

## 2023-11-21 PROCEDURE — 93296 REM INTERROG EVL PM/IDS: CPT

## 2024-01-23 ENCOUNTER — APPOINTMENT (OUTPATIENT)
Dept: HEART AND VASCULAR | Facility: CLINIC | Age: 89
End: 2024-01-23
Payer: MEDICARE

## 2024-01-23 ENCOUNTER — NON-APPOINTMENT (OUTPATIENT)
Age: 89
End: 2024-01-23

## 2024-01-23 VITALS
HEIGHT: 65 IN | DIASTOLIC BLOOD PRESSURE: 80 MMHG | RESPIRATION RATE: 12 BRPM | WEIGHT: 189 LBS | HEART RATE: 60 BPM | BODY MASS INDEX: 31.49 KG/M2 | SYSTOLIC BLOOD PRESSURE: 140 MMHG

## 2024-01-23 DIAGNOSIS — I25.10 ATHEROSCLEROTIC HEART DISEASE OF NATIVE CORONARY ARTERY W/OUT ANGINA PECTORIS: ICD-10-CM

## 2024-01-23 DIAGNOSIS — E78.5 HYPERLIPIDEMIA, UNSPECIFIED: ICD-10-CM

## 2024-01-23 DIAGNOSIS — I10 ESSENTIAL (PRIMARY) HYPERTENSION: ICD-10-CM

## 2024-01-23 DIAGNOSIS — I49.9 CARDIAC ARRHYTHMIA, UNSPECIFIED: ICD-10-CM

## 2024-01-23 PROCEDURE — 99214 OFFICE O/P EST MOD 30 MIN: CPT

## 2024-01-23 PROCEDURE — 93000 ELECTROCARDIOGRAM COMPLETE: CPT | Mod: XU

## 2024-01-23 PROCEDURE — G2211 COMPLEX E/M VISIT ADD ON: CPT

## 2024-01-23 PROCEDURE — 93288 INTERROG EVL PM/LDLS PM IP: CPT

## 2024-01-23 NOTE — PHYSICAL EXAM

## 2024-01-23 NOTE — PROCEDURE
[Pacemaker] : pacemaker [DDD] : DDD [Longevity: ___ months] : The estimated remaining battery life is [unfilled] months [de-identified] : [facility]

## 2024-01-23 NOTE — DISCUSSION/SUMMARY
[EKG obtained to assist in diagnosis and management of assessed problem(s)] : EKG obtained to assist in diagnosis and management of assessed problem(s) [FreeTextEntry1] : 1.  CAD: EKG; continue aspirin and metoprolol. 2.  Hypertension: For now continue metoprolol alone advised low-salt diet. 3.  Hyperlipidemia: Continue Crestor 4.  Permanent pacemaker: Will interrogate device today. 5.  Dizziness: Advised to speak to his primary care physician regarding referral to neurology for further workup.  Patient is under the care of primary care physician advised to follow-up for routine blood work  [Pacemaker Function Normal] : normal pacemaker function

## 2024-01-23 NOTE — DISCUSSION/SUMMARY
[FreeTextEntry1] : 1.  CAD: EKG; continue aspirin and metoprolol. 2.  Hypertension: For now continue metoprolol alone advised low-salt diet. 3.  Hyperlipidemia: Continue Crestor 4.  Permanent pacemaker: Will interrogate device today. 5.  Dizziness: Advised to speak to his primary care physician regarding referral to neurology for further workup.  Patient is under the care of primary care physician advised to follow-up for routine blood work  [EKG obtained to assist in diagnosis and management of assessed problem(s)] : EKG obtained to assist in diagnosis and management of assessed problem(s) [Pacemaker Function Normal] : normal pacemaker function

## 2024-01-23 NOTE — HISTORY OF PRESENT ILLNESS
[FreeTextEntry1] : 89-year-old male with past medical history of CAD hypertension hyperlipidemia status post permanent pacemaker comes in for routine follow-up.  Overall, feels at baseline denies chest pain shortness of breath PND orthopnea or palpitations.  However continues to suffer from lightheadedness which did not really markedly improve after placement of a pacemaker.

## 2024-01-23 NOTE — PROCEDURE
[Pacemaker] : pacemaker [DDD] : DDD [Longevity: ___ months] : The estimated remaining battery life is [unfilled] months [de-identified] : [facility]

## 2024-01-23 NOTE — PHYSICAL EXAM

## 2024-01-23 NOTE — REASON FOR VISIT
[Hyperlipidemia] : hyperlipidemia [Hypertension] : hypertension [Coronary Artery Disease] : coronary artery disease [Follow-up Device Check] : is here today for a follow-up device check visit for

## 2024-02-22 ENCOUNTER — NON-APPOINTMENT (OUTPATIENT)
Age: 89
End: 2024-02-22

## 2024-02-23 ENCOUNTER — APPOINTMENT (OUTPATIENT)
Dept: HEART AND VASCULAR | Facility: CLINIC | Age: 89
End: 2024-02-23
Payer: MEDICARE

## 2024-02-23 PROCEDURE — 93296 REM INTERROG EVL PM/IDS: CPT

## 2024-02-23 PROCEDURE — 93294 REM INTERROG EVL PM/LDLS PM: CPT

## 2024-05-27 ENCOUNTER — NON-APPOINTMENT (OUTPATIENT)
Age: 89
End: 2024-05-27

## 2024-05-28 ENCOUNTER — APPOINTMENT (OUTPATIENT)
Dept: HEART AND VASCULAR | Facility: CLINIC | Age: 89
End: 2024-05-28
Payer: MEDICARE

## 2024-05-28 PROCEDURE — 93294 REM INTERROG EVL PM/LDLS PM: CPT

## 2024-05-28 PROCEDURE — 93296 REM INTERROG EVL PM/IDS: CPT

## 2024-07-23 ENCOUNTER — APPOINTMENT (OUTPATIENT)
Dept: HEART AND VASCULAR | Facility: CLINIC | Age: 89
End: 2024-07-23
Payer: MEDICARE

## 2024-07-23 ENCOUNTER — NON-APPOINTMENT (OUTPATIENT)
Age: 89
End: 2024-07-23

## 2024-07-23 VITALS
BODY MASS INDEX: 30.99 KG/M2 | SYSTOLIC BLOOD PRESSURE: 180 MMHG | WEIGHT: 186 LBS | HEIGHT: 65 IN | HEART RATE: 73 BPM | DIASTOLIC BLOOD PRESSURE: 90 MMHG

## 2024-07-23 VITALS — DIASTOLIC BLOOD PRESSURE: 92 MMHG | SYSTOLIC BLOOD PRESSURE: 182 MMHG

## 2024-07-23 VITALS — SYSTOLIC BLOOD PRESSURE: 162 MMHG | DIASTOLIC BLOOD PRESSURE: 80 MMHG

## 2024-07-23 DIAGNOSIS — I34.0 NONRHEUMATIC MITRAL (VALVE) INSUFFICIENCY: ICD-10-CM

## 2024-07-23 DIAGNOSIS — I25.10 ATHEROSCLEROTIC HEART DISEASE OF NATIVE CORONARY ARTERY W/OUT ANGINA PECTORIS: ICD-10-CM

## 2024-07-23 DIAGNOSIS — R09.89 OTHER SPECIFIED SYMPTOMS AND SIGNS INVOLVING THE CIRCULATORY AND RESPIRATORY SYSTEMS: ICD-10-CM

## 2024-07-23 DIAGNOSIS — I10 ESSENTIAL (PRIMARY) HYPERTENSION: ICD-10-CM

## 2024-07-23 PROCEDURE — 99214 OFFICE O/P EST MOD 30 MIN: CPT

## 2024-07-23 PROCEDURE — G2211 COMPLEX E/M VISIT ADD ON: CPT

## 2024-07-23 PROCEDURE — ZZZZZ: CPT

## 2024-07-23 PROCEDURE — 93306 TTE W/DOPPLER COMPLETE: CPT

## 2024-07-23 PROCEDURE — 93000 ELECTROCARDIOGRAM COMPLETE: CPT

## 2024-07-23 RX ORDER — AMLODIPINE BESYLATE 5 MG/1
5 TABLET ORAL
Qty: 180 | Refills: 3 | Status: ACTIVE | COMMUNITY
Start: 1900-01-01 | End: 1900-01-01

## 2024-07-23 NOTE — ADDENDUM
[FreeTextEntry1] : Irvin Fontanez assisted in documentation on 07/23/24 acting as a scribe for Dr. Rajesh Madison.

## 2024-07-23 NOTE — HISTORY OF PRESENT ILLNESS
[FreeTextEntry1] : 07/23/24Patient is a 89-year-old male status post multivessel stenting in the past who also has a pacemaker inserted.  Patient presents with progressive symptoms of lightheadedness with no symptoms of vertigo patient does have some postural symptoms when getting out of a chair and in bed he denies syncope or near syncope.  He has no current symptoms of chest pain or shortness of breath he denies any systemic illness of fever or chills loss of appetite diarrhea dysuria or dyspepsia no

## 2024-07-23 NOTE — REVIEW OF SYSTEMS
[Feeling Fatigued] : feeling fatigued [Dizziness] : dizziness [Negative] : Genitourinary [Headache] : no headache [Weight Gain (___ Lbs)] : no recent weight gain [Weight Loss (___ Lbs)] : no recent weight loss [Chest Discomfort] : no chest discomfort [Lower Ext Edema] : no extremity edema [PND] : no PND [Abdominal Pain] : no abdominal pain [Change in Appetite] : no change in appetite [Constipation] : no constipation [Limb Weakness (Paresis)] : no limb weakness (Paresis)

## 2024-07-23 NOTE — PHYSICAL EXAM
[5th Left ICS - MCL] : palpated at the 5th LICS in the midclavicular line [Rhythm Regular] : regular [Normal S1] : normal S1 [Normal S2] : normal S2 [II] : a grade 2 [Normal] : no rash, no skin lesions [de-identified] : BILAT

## 2024-07-23 NOTE — DISCUSSION/SUMMARY
[Hypertension] : hypertension [Not Responding to Treatment] : not responding to treatment [ECG] : ECG [Echocardiogram] : echocardiogram [Continue] : continuing beta blockers [Increase] : increasing calcium channel blockers [Home Monitor] : a home monitor [Weight Loss] : weight loss [Low Sodium Diet] : low sodium diet [Patient] : the patient [EKG obtained to assist in diagnosis and management of assessed problem(s)] : EKG obtained to assist in diagnosis and management of assessed problem(s) [FreeTextEntry1] : Bp elevated will inc CCB to norvasc 5 bid  Lvef 55% MILD AS PG  ~19 mmhg

## 2024-07-23 NOTE — ASSESSMENT
[FreeTextEntry1] : Lifestyle changes for control of BP reviewed ie wgt reduction, salt restriction, Etoh avoidance, exercise 30 min aerobic activity per day, avoid NSAID use self monitor BP TIW Lifestyle advice for LDL reduction including reduction of red meat consumption concentrating on a plant based diet. 30 min aerobic exercise per day. Calorie reduction to target BMI<25

## 2024-08-20 ENCOUNTER — APPOINTMENT (OUTPATIENT)
Dept: HEART AND VASCULAR | Facility: CLINIC | Age: 89
End: 2024-08-20
Payer: MEDICARE

## 2024-08-20 ENCOUNTER — NON-APPOINTMENT (OUTPATIENT)
Age: 89
End: 2024-08-20

## 2024-08-20 VITALS
RESPIRATION RATE: 12 BRPM | HEART RATE: 71 BPM | SYSTOLIC BLOOD PRESSURE: 142 MMHG | HEIGHT: 65 IN | WEIGHT: 184 LBS | BODY MASS INDEX: 30.66 KG/M2 | DIASTOLIC BLOOD PRESSURE: 85 MMHG

## 2024-08-20 DIAGNOSIS — I49.9 CARDIAC ARRHYTHMIA, UNSPECIFIED: ICD-10-CM

## 2024-08-20 DIAGNOSIS — H81.393 OTHER PERIPHERAL VERTIGO, BILATERAL: ICD-10-CM

## 2024-08-20 PROCEDURE — G2211 COMPLEX E/M VISIT ADD ON: CPT

## 2024-08-20 PROCEDURE — 99214 OFFICE O/P EST MOD 30 MIN: CPT

## 2024-08-20 PROCEDURE — 93000 ELECTROCARDIOGRAM COMPLETE: CPT

## 2024-08-20 RX ORDER — MECLIZINE HYDROCHLORIDE 25 MG/1
25 TABLET ORAL TWICE DAILY
Qty: 60 | Refills: 5 | Status: ACTIVE | COMMUNITY
Start: 2024-08-20 | End: 1900-01-01

## 2024-08-20 NOTE — PHYSICAL EXAM
[5th Left ICS - MCL] : palpated at the 5th LICS in the midclavicular line [Rhythm Regular] : regular [Normal S1] : normal S1 [Normal S2] : normal S2 [II] : a grade 2 [Normal] : no rash, no skin lesions [de-identified] : BILAT

## 2024-08-20 NOTE — HISTORY OF PRESENT ILLNESS
[FreeTextEntry1] : 07/23/24Patient is a 89-year-old male status post multivessel stenting in the past who also has a pacemaker inserted.  Patient presents with progressive symptoms of lightheadedness with no symptoms of vertigo patient does have some postural symptoms when getting out of a chair and in bed he denies syncope or near syncope.  He has no current symptoms of chest pain or shortness of breath he denies any systemic illness of fever or chills loss of appetite diarrhea dysuria or dyspepsia  08/20/24 ongoing lightheadness ' relief w meclizine  no sscp or goldstein  has PPM in place

## 2024-08-20 NOTE — ADDENDUM
[FreeTextEntry1] : Irvin Fontanez assisted in documentation on 08/20/24 acting as a scribe for Dr. Rajesh Madison.

## 2024-08-28 ENCOUNTER — NON-APPOINTMENT (OUTPATIENT)
Age: 89
End: 2024-08-28

## 2024-08-29 ENCOUNTER — APPOINTMENT (OUTPATIENT)
Dept: HEART AND VASCULAR | Facility: CLINIC | Age: 89
End: 2024-08-29
Payer: MEDICARE

## 2024-08-29 PROCEDURE — 93294 REM INTERROG EVL PM/LDLS PM: CPT

## 2024-08-29 PROCEDURE — 93296 REM INTERROG EVL PM/IDS: CPT

## 2024-11-20 ENCOUNTER — LABORATORY RESULT (OUTPATIENT)
Age: 89
End: 2024-11-20

## 2024-11-20 ENCOUNTER — NON-APPOINTMENT (OUTPATIENT)
Age: 89
End: 2024-11-20

## 2024-11-20 ENCOUNTER — APPOINTMENT (OUTPATIENT)
Dept: HEART AND VASCULAR | Facility: CLINIC | Age: 89
End: 2024-11-20
Payer: MEDICARE

## 2024-11-20 VITALS
BODY MASS INDEX: 30.49 KG/M2 | SYSTOLIC BLOOD PRESSURE: 130 MMHG | WEIGHT: 183 LBS | HEIGHT: 65 IN | HEART RATE: 71 BPM | DIASTOLIC BLOOD PRESSURE: 81 MMHG

## 2024-11-20 DIAGNOSIS — Z00.00 ENCOUNTER FOR GENERAL ADULT MEDICAL EXAMINATION W/OUT ABNORMAL FINDINGS: ICD-10-CM

## 2024-11-20 PROCEDURE — 93000 ELECTROCARDIOGRAM COMPLETE: CPT

## 2024-11-20 PROCEDURE — 99214 OFFICE O/P EST MOD 30 MIN: CPT

## 2024-11-21 ENCOUNTER — NON-APPOINTMENT (OUTPATIENT)
Age: 89
End: 2024-11-21

## 2024-11-21 LAB
25(OH)D3 SERPL-MCNC: 34.1 NG/ML
ALBUMIN SERPL ELPH-MCNC: 4.6 G/DL
ALP BLD-CCNC: 89 U/L
ALT SERPL-CCNC: 12 U/L
ANION GAP SERPL CALC-SCNC: 15 MMOL/L
AST SERPL-CCNC: 17 U/L
BASOPHILS # BLD AUTO: 0.05 K/UL
BASOPHILS NFR BLD AUTO: 0.7 %
BILIRUB SERPL-MCNC: 0.5 MG/DL
BUN SERPL-MCNC: 22 MG/DL
CALCIUM SERPL-MCNC: 10 MG/DL
CHLORIDE SERPL-SCNC: 105 MMOL/L
CHOLEST SERPL-MCNC: 153 MG/DL
CO2 SERPL-SCNC: 26 MMOL/L
CREAT SERPL-MCNC: 1.06 MG/DL
EGFR: 65 ML/MIN/1.73M2
EOSINOPHIL # BLD AUTO: 0.07 K/UL
EOSINOPHIL NFR BLD AUTO: 0.9 %
ESTIMATED AVERAGE GLUCOSE: 117 MG/DL
FOLATE SERPL-MCNC: 9.4 NG/ML
GLUCOSE SERPL-MCNC: 94 MG/DL
HBA1C MFR BLD HPLC: 5.7 %
HCT VFR BLD CALC: 46.3 %
HDLC SERPL-MCNC: 47 MG/DL
HGB BLD-MCNC: 14.6 G/DL
IMM GRANULOCYTES NFR BLD AUTO: 0.4 %
LDLC SERPL CALC-MCNC: 70 MG/DL
LYMPHOCYTES # BLD AUTO: 1.94 K/UL
LYMPHOCYTES NFR BLD AUTO: 26 %
MAN DIFF?: NORMAL
MCHC RBC-ENTMCNC: 30.8 PG
MCHC RBC-ENTMCNC: 31.5 G/DL
MCV RBC AUTO: 97.7 FL
MONOCYTES # BLD AUTO: 0.66 K/UL
MONOCYTES NFR BLD AUTO: 8.8 %
NEUTROPHILS # BLD AUTO: 4.72 K/UL
NEUTROPHILS NFR BLD AUTO: 63.2 %
NONHDLC SERPL-MCNC: 105 MG/DL
PLATELET # BLD AUTO: 259 K/UL
POTASSIUM SERPL-SCNC: 5.5 MMOL/L
PROT SERPL-MCNC: 7.3 G/DL
RBC # BLD: 4.74 M/UL
RBC # FLD: 13.6 %
SODIUM SERPL-SCNC: 146 MMOL/L
T3FREE SERPL-MCNC: 2.95 PG/ML
T3RU NFR SERPL: 1.1 TBI
T4 FREE SERPL-MCNC: 0.9 NG/DL
T4 SERPL-MCNC: 6.3 UG/DL
TRIGL SERPL-MCNC: 213 MG/DL
TSH SERPL-ACNC: 2.45 UIU/ML
VIT B12 SERPL-MCNC: 439 PG/ML
WBC # FLD AUTO: 7.47 K/UL

## 2024-11-29 ENCOUNTER — APPOINTMENT (OUTPATIENT)
Dept: HEART AND VASCULAR | Facility: CLINIC | Age: 89
End: 2024-11-29

## 2024-12-09 ENCOUNTER — APPOINTMENT (OUTPATIENT)
Dept: HEART AND VASCULAR | Facility: CLINIC | Age: 88
End: 2024-12-09
Payer: MEDICARE

## 2024-12-09 ENCOUNTER — NON-APPOINTMENT (OUTPATIENT)
Age: 88
End: 2024-12-09

## 2024-12-09 PROCEDURE — 93294 REM INTERROG EVL PM/LDLS PM: CPT

## 2024-12-09 PROCEDURE — 93296 REM INTERROG EVL PM/IDS: CPT

## 2025-03-10 ENCOUNTER — NON-APPOINTMENT (OUTPATIENT)
Age: 89
End: 2025-03-10

## 2025-03-10 ENCOUNTER — APPOINTMENT (OUTPATIENT)
Dept: HEART AND VASCULAR | Facility: CLINIC | Age: 89
End: 2025-03-10

## 2025-03-10 ENCOUNTER — APPOINTMENT (OUTPATIENT)
Dept: HEART AND VASCULAR | Facility: CLINIC | Age: 89
End: 2025-03-10
Payer: MEDICARE

## 2025-03-10 PROCEDURE — 93294 REM INTERROG EVL PM/LDLS PM: CPT

## 2025-03-10 PROCEDURE — 93296 REM INTERROG EVL PM/IDS: CPT

## 2025-03-20 ENCOUNTER — APPOINTMENT (OUTPATIENT)
Dept: HEART AND VASCULAR | Facility: CLINIC | Age: 89
End: 2025-03-20

## 2025-06-09 ENCOUNTER — APPOINTMENT (OUTPATIENT)
Dept: HEART AND VASCULAR | Facility: CLINIC | Age: 89
End: 2025-06-09
Payer: MEDICARE

## 2025-06-09 ENCOUNTER — NON-APPOINTMENT (OUTPATIENT)
Age: 89
End: 2025-06-09

## 2025-06-09 PROCEDURE — 93296 REM INTERROG EVL PM/IDS: CPT

## 2025-06-09 PROCEDURE — 93294 REM INTERROG EVL PM/LDLS PM: CPT

## 2025-09-08 ENCOUNTER — APPOINTMENT (OUTPATIENT)
Dept: HEART AND VASCULAR | Facility: CLINIC | Age: 89
End: 2025-09-08
Payer: MEDICARE

## 2025-09-08 ENCOUNTER — NON-APPOINTMENT (OUTPATIENT)
Age: 89
End: 2025-09-08

## 2025-09-08 PROCEDURE — 93294 REM INTERROG EVL PM/LDLS PM: CPT

## 2025-09-08 PROCEDURE — 93296 REM INTERROG EVL PM/IDS: CPT

## 2025-09-15 ENCOUNTER — APPOINTMENT (OUTPATIENT)
Dept: HEART AND VASCULAR | Facility: CLINIC | Age: 89
End: 2025-09-15
Payer: MEDICARE

## 2025-09-15 ENCOUNTER — NON-APPOINTMENT (OUTPATIENT)
Age: 89
End: 2025-09-15

## 2025-09-15 ENCOUNTER — APPOINTMENT (OUTPATIENT)
Dept: HEART AND VASCULAR | Facility: CLINIC | Age: 89
End: 2025-09-15

## 2025-09-15 VITALS
RESPIRATION RATE: 12 BRPM | BODY MASS INDEX: 30.32 KG/M2 | DIASTOLIC BLOOD PRESSURE: 80 MMHG | HEIGHT: 65 IN | HEART RATE: 71 BPM | SYSTOLIC BLOOD PRESSURE: 130 MMHG | WEIGHT: 182 LBS

## 2025-09-15 DIAGNOSIS — I10 ESSENTIAL (PRIMARY) HYPERTENSION: ICD-10-CM

## 2025-09-15 DIAGNOSIS — M43.6 TORTICOLLIS: ICD-10-CM

## 2025-09-15 DIAGNOSIS — E78.5 HYPERLIPIDEMIA, UNSPECIFIED: ICD-10-CM

## 2025-09-15 DIAGNOSIS — I34.0 NONRHEUMATIC MITRAL (VALVE) INSUFFICIENCY: ICD-10-CM

## 2025-09-15 DIAGNOSIS — R09.89 OTHER SPECIFIED SYMPTOMS AND SIGNS INVOLVING THE CIRCULATORY AND RESPIRATORY SYSTEMS: ICD-10-CM

## 2025-09-15 DIAGNOSIS — I25.10 ATHEROSCLEROTIC HEART DISEASE OF NATIVE CORONARY ARTERY W/OUT ANGINA PECTORIS: ICD-10-CM

## 2025-09-15 PROCEDURE — 93000 ELECTROCARDIOGRAM COMPLETE: CPT

## 2025-09-15 PROCEDURE — 93880 EXTRACRANIAL BILAT STUDY: CPT

## 2025-09-15 PROCEDURE — G2211 COMPLEX E/M VISIT ADD ON: CPT

## 2025-09-15 PROCEDURE — 93306 TTE W/DOPPLER COMPLETE: CPT

## 2025-09-15 PROCEDURE — 99214 OFFICE O/P EST MOD 30 MIN: CPT

## 2025-09-15 RX ORDER — METHYLPREDNISOLONE 4 MG/1
4 TABLET ORAL
Qty: 1 | Refills: 0 | Status: ACTIVE | COMMUNITY
Start: 2025-09-15 | End: 1900-01-01